# Patient Record
Sex: MALE | Race: BLACK OR AFRICAN AMERICAN | Employment: FULL TIME | URBAN - METROPOLITAN AREA
[De-identification: names, ages, dates, MRNs, and addresses within clinical notes are randomized per-mention and may not be internally consistent; named-entity substitution may affect disease eponyms.]

---

## 2018-05-05 ENCOUNTER — RECORDS - HEALTHEAST (OUTPATIENT)
Dept: LAB | Facility: CLINIC | Age: 43
End: 2018-05-05

## 2018-05-05 LAB
ALBUMIN UR-MCNC: NEGATIVE MG/DL
APPEARANCE UR: CLEAR
BILIRUB UR QL STRIP: NEGATIVE
CHOLEST SERPL-MCNC: 207 MG/DL
COLOR UR AUTO: NORMAL
FASTING STATUS PATIENT QL REPORTED: ABNORMAL
FASTING STATUS PATIENT QL REPORTED: NORMAL
GLUCOSE BLD-MCNC: 92 MG/DL (ref 70–125)
GLUCOSE UR STRIP-MCNC: NEGATIVE MG/DL
HDLC SERPL-MCNC: 75 MG/DL
HGB UR QL STRIP: NEGATIVE
KETONES UR STRIP-MCNC: NEGATIVE MG/DL
LDLC SERPL CALC-MCNC: 111 MG/DL
LEUKOCYTE ESTERASE UR QL STRIP: NEGATIVE
NITRATE UR QL: NEGATIVE
PH UR STRIP: 6.5 [PH] (ref 4.5–8)
SICKLE CELL PREP: NEGATIVE
SP GR UR STRIP: 1.01 (ref 1–1.03)
TRIGL SERPL-MCNC: 103 MG/DL
UROBILINOGEN UR STRIP-ACNC: NORMAL

## 2020-08-23 ENCOUNTER — HOSPITAL ENCOUNTER (OUTPATIENT)
Facility: CLINIC | Age: 45
Setting detail: OBSERVATION
Discharge: HOME | End: 2020-08-24
Attending: EMERGENCY MEDICINE | Admitting: HOSPITALIST
Payer: OTHER GOVERNMENT

## 2020-08-23 ENCOUNTER — APPOINTMENT (OUTPATIENT)
Dept: MRI IMAGING | Facility: CLINIC | Age: 45
End: 2020-08-23
Attending: EMERGENCY MEDICINE
Payer: OTHER GOVERNMENT

## 2020-08-23 DIAGNOSIS — M54.50 LOW BACK PAIN RADIATING TO LEFT LEG: ICD-10-CM

## 2020-08-23 DIAGNOSIS — M51.16 LUMBAR DISC HERNIATION WITH RADICULOPATHY: Primary | ICD-10-CM

## 2020-08-23 DIAGNOSIS — R79.89 ELEVATED TROPONIN: ICD-10-CM

## 2020-08-23 DIAGNOSIS — M79.605 LOW BACK PAIN RADIATING TO LEFT LEG: ICD-10-CM

## 2020-08-23 DIAGNOSIS — R55 SYNCOPE, UNSPECIFIED SYNCOPE TYPE: ICD-10-CM

## 2020-08-23 LAB
ANION GAP SERPL CALCULATED.3IONS-SCNC: 5 MMOL/L (ref 3–14)
BASOPHILS # BLD AUTO: 0.1 10E9/L (ref 0–0.2)
BASOPHILS NFR BLD AUTO: 1.1 %
BUN SERPL-MCNC: 23 MG/DL (ref 7–30)
CALCIUM SERPL-MCNC: 8.6 MG/DL (ref 8.5–10.1)
CHLORIDE SERPL-SCNC: 105 MMOL/L (ref 94–109)
CO2 SERPL-SCNC: 25 MMOL/L (ref 20–32)
CREAT SERPL-MCNC: 1.42 MG/DL (ref 0.66–1.25)
D DIMER PPP FEU-MCNC: 0.3 UG/ML FEU (ref 0–0.5)
DIFFERENTIAL METHOD BLD: ABNORMAL
EOSINOPHIL # BLD AUTO: 0.5 10E9/L (ref 0–0.7)
EOSINOPHIL NFR BLD AUTO: 9.4 %
ERYTHROCYTE [DISTWIDTH] IN BLOOD BY AUTOMATED COUNT: 13.8 % (ref 10–15)
GFR SERPL CREATININE-BSD FRML MDRD: 60 ML/MIN/{1.73_M2}
GLUCOSE SERPL-MCNC: 163 MG/DL (ref 70–99)
HBA1C MFR BLD: 5.5 % (ref 0–5.6)
HCT VFR BLD AUTO: 47.2 % (ref 40–53)
HGB BLD-MCNC: 14.6 G/DL (ref 13.3–17.7)
IMM GRANULOCYTES # BLD: 0 10E9/L (ref 0–0.4)
IMM GRANULOCYTES NFR BLD: 0.4 %
LYMPHOCYTES # BLD AUTO: 2.1 10E9/L (ref 0.8–5.3)
LYMPHOCYTES NFR BLD AUTO: 40.2 %
MCH RBC QN AUTO: 27.3 PG (ref 26.5–33)
MCHC RBC AUTO-ENTMCNC: 30.9 G/DL (ref 31.5–36.5)
MCV RBC AUTO: 88 FL (ref 78–100)
MONOCYTES # BLD AUTO: 0.3 10E9/L (ref 0–1.3)
MONOCYTES NFR BLD AUTO: 5.8 %
NEUTROPHILS # BLD AUTO: 2.3 10E9/L (ref 1.6–8.3)
NEUTROPHILS NFR BLD AUTO: 43.1 %
NRBC # BLD AUTO: 0 10*3/UL
NRBC BLD AUTO-RTO: 0 /100
PLATELET # BLD AUTO: 199 10E9/L (ref 150–450)
POTASSIUM SERPL-SCNC: 3.9 MMOL/L (ref 3.4–5.3)
RBC # BLD AUTO: 5.35 10E12/L (ref 4.4–5.9)
SODIUM SERPL-SCNC: 135 MMOL/L (ref 133–144)
TROPONIN I SERPL-MCNC: 0.13 UG/L (ref 0–0.04)
TROPONIN I SERPL-MCNC: 0.13 UG/L (ref 0–0.04)
WBC # BLD AUTO: 5.3 10E9/L (ref 4–11)

## 2020-08-23 PROCEDURE — 93005 ELECTROCARDIOGRAM TRACING: CPT | Mod: 76

## 2020-08-23 PROCEDURE — 25800030 ZZH RX IP 258 OP 636: Performed by: HOSPITALIST

## 2020-08-23 PROCEDURE — 96376 TX/PRO/DX INJ SAME DRUG ADON: CPT

## 2020-08-23 PROCEDURE — 96361 HYDRATE IV INFUSION ADD-ON: CPT

## 2020-08-23 PROCEDURE — U0003 INFECTIOUS AGENT DETECTION BY NUCLEIC ACID (DNA OR RNA); SEVERE ACUTE RESPIRATORY SYNDROME CORONAVIRUS 2 (SARS-COV-2) (CORONAVIRUS DISEASE [COVID-19]), AMPLIFIED PROBE TECHNIQUE, MAKING USE OF HIGH THROUGHPUT TECHNOLOGIES AS DESCRIBED BY CMS-2020-01-R: HCPCS | Performed by: EMERGENCY MEDICINE

## 2020-08-23 PROCEDURE — 25800030 ZZH RX IP 258 OP 636: Performed by: EMERGENCY MEDICINE

## 2020-08-23 PROCEDURE — 25000132 ZZH RX MED GY IP 250 OP 250 PS 637: Performed by: HOSPITALIST

## 2020-08-23 PROCEDURE — 25000128 H RX IP 250 OP 636: Performed by: EMERGENCY MEDICINE

## 2020-08-23 PROCEDURE — 83036 HEMOGLOBIN GLYCOSYLATED A1C: CPT | Performed by: EMERGENCY MEDICINE

## 2020-08-23 PROCEDURE — G0378 HOSPITAL OBSERVATION PER HR: HCPCS

## 2020-08-23 PROCEDURE — 99220 ZZC INITIAL OBSERVATION CARE,LEVL III: CPT | Mod: AI | Performed by: HOSPITALIST

## 2020-08-23 PROCEDURE — 25000132 ZZH RX MED GY IP 250 OP 250 PS 637: Performed by: EMERGENCY MEDICINE

## 2020-08-23 PROCEDURE — 96374 THER/PROPH/DIAG INJ IV PUSH: CPT

## 2020-08-23 PROCEDURE — 84484 ASSAY OF TROPONIN QUANT: CPT | Performed by: EMERGENCY MEDICINE

## 2020-08-23 PROCEDURE — 99207 ZZC CDG-CODE CATEGORY CHANGED: CPT | Performed by: HOSPITALIST

## 2020-08-23 PROCEDURE — 85379 FIBRIN DEGRADATION QUANT: CPT | Performed by: EMERGENCY MEDICINE

## 2020-08-23 PROCEDURE — 36415 COLL VENOUS BLD VENIPUNCTURE: CPT | Performed by: HOSPITALIST

## 2020-08-23 PROCEDURE — 84484 ASSAY OF TROPONIN QUANT: CPT | Performed by: HOSPITALIST

## 2020-08-23 PROCEDURE — 25000131 ZZH RX MED GY IP 250 OP 636 PS 637: Performed by: EMERGENCY MEDICINE

## 2020-08-23 PROCEDURE — 72148 MRI LUMBAR SPINE W/O DYE: CPT

## 2020-08-23 PROCEDURE — 93005 ELECTROCARDIOGRAM TRACING: CPT

## 2020-08-23 PROCEDURE — 80048 BASIC METABOLIC PNL TOTAL CA: CPT | Performed by: EMERGENCY MEDICINE

## 2020-08-23 PROCEDURE — C9803 HOPD COVID-19 SPEC COLLECT: HCPCS

## 2020-08-23 PROCEDURE — 99285 EMERGENCY DEPT VISIT HI MDM: CPT | Mod: 25

## 2020-08-23 PROCEDURE — 85025 COMPLETE CBC W/AUTO DIFF WBC: CPT | Performed by: EMERGENCY MEDICINE

## 2020-08-23 RX ORDER — SODIUM CHLORIDE 9 MG/ML
1000 INJECTION, SOLUTION INTRAVENOUS CONTINUOUS
Status: DISCONTINUED | OUTPATIENT
Start: 2020-08-23 | End: 2020-08-23 | Stop reason: ALTCHOICE

## 2020-08-23 RX ORDER — HYDROMORPHONE HYDROCHLORIDE 1 MG/ML
0.5 INJECTION, SOLUTION INTRAMUSCULAR; INTRAVENOUS; SUBCUTANEOUS
Status: DISCONTINUED | OUTPATIENT
Start: 2020-08-23 | End: 2020-08-23

## 2020-08-23 RX ORDER — ACETAMINOPHEN 325 MG/1
650 TABLET ORAL EVERY 4 HOURS PRN
Status: DISCONTINUED | OUTPATIENT
Start: 2020-08-23 | End: 2020-08-24 | Stop reason: HOSPADM

## 2020-08-23 RX ORDER — ONDANSETRON 2 MG/ML
4 INJECTION INTRAMUSCULAR; INTRAVENOUS EVERY 6 HOURS PRN
Status: DISCONTINUED | OUTPATIENT
Start: 2020-08-23 | End: 2020-08-24 | Stop reason: HOSPADM

## 2020-08-23 RX ORDER — HYDROCODONE BITARTRATE AND ACETAMINOPHEN 5; 325 MG/1; MG/1
1 TABLET ORAL EVERY 6 HOURS PRN
Status: DISCONTINUED | OUTPATIENT
Start: 2020-08-23 | End: 2020-08-23

## 2020-08-23 RX ORDER — ONDANSETRON 4 MG/1
4 TABLET, ORALLY DISINTEGRATING ORAL EVERY 6 HOURS PRN
Status: DISCONTINUED | OUTPATIENT
Start: 2020-08-23 | End: 2020-08-24 | Stop reason: HOSPADM

## 2020-08-23 RX ORDER — HYDROCODONE BITARTRATE AND ACETAMINOPHEN 5; 325 MG/1; MG/1
1-2 TABLET ORAL EVERY 6 HOURS PRN
Status: DISCONTINUED | OUTPATIENT
Start: 2020-08-23 | End: 2020-08-24 | Stop reason: HOSPADM

## 2020-08-23 RX ORDER — FEXOFENADINE HCL 180 MG/1
180 TABLET ORAL DAILY
COMMUNITY

## 2020-08-23 RX ORDER — ASPIRIN 81 MG/1
81 TABLET ORAL DAILY
Status: DISCONTINUED | OUTPATIENT
Start: 2020-08-24 | End: 2020-08-24 | Stop reason: HOSPADM

## 2020-08-23 RX ORDER — PREDNISONE 20 MG/1
60 TABLET ORAL ONCE
Status: COMPLETED | OUTPATIENT
Start: 2020-08-23 | End: 2020-08-23

## 2020-08-23 RX ORDER — FEXOFENADINE HCL 180 MG/1
180 TABLET ORAL DAILY
Status: DISCONTINUED | OUTPATIENT
Start: 2020-08-24 | End: 2020-08-24 | Stop reason: HOSPADM

## 2020-08-23 RX ORDER — SODIUM CHLORIDE, SODIUM LACTATE, POTASSIUM CHLORIDE, CALCIUM CHLORIDE 600; 310; 30; 20 MG/100ML; MG/100ML; MG/100ML; MG/100ML
INJECTION, SOLUTION INTRAVENOUS CONTINUOUS
Status: ACTIVE | OUTPATIENT
Start: 2020-08-23 | End: 2020-08-24

## 2020-08-23 RX ORDER — NALOXONE HYDROCHLORIDE 0.4 MG/ML
.1-.4 INJECTION, SOLUTION INTRAMUSCULAR; INTRAVENOUS; SUBCUTANEOUS
Status: DISCONTINUED | OUTPATIENT
Start: 2020-08-23 | End: 2020-08-24 | Stop reason: HOSPADM

## 2020-08-23 RX ORDER — FLUTICASONE PROPIONATE 50 MCG
2 SPRAY, SUSPENSION (ML) NASAL DAILY
Status: DISCONTINUED | OUTPATIENT
Start: 2020-08-24 | End: 2020-08-24 | Stop reason: CLARIF

## 2020-08-23 RX ORDER — ASPIRIN 325 MG
325 TABLET ORAL ONCE
Status: COMPLETED | OUTPATIENT
Start: 2020-08-23 | End: 2020-08-23

## 2020-08-23 RX ORDER — FLUTICASONE PROPIONATE 50 MCG
2 SPRAY, SUSPENSION (ML) NASAL DAILY
COMMUNITY

## 2020-08-23 RX ORDER — ACETAMINOPHEN 650 MG/1
650 SUPPOSITORY RECTAL EVERY 4 HOURS PRN
Status: DISCONTINUED | OUTPATIENT
Start: 2020-08-23 | End: 2020-08-24 | Stop reason: HOSPADM

## 2020-08-23 RX ADMIN — PREDNISONE 60 MG: 20 TABLET ORAL at 18:21

## 2020-08-23 RX ADMIN — ASPIRIN 325 MG ORAL TABLET 325 MG: 325 PILL ORAL at 18:49

## 2020-08-23 RX ADMIN — HYDROMORPHONE HYDROCHLORIDE 0.5 MG: 1 INJECTION, SOLUTION INTRAMUSCULAR; INTRAVENOUS; SUBCUTANEOUS at 19:35

## 2020-08-23 RX ADMIN — HYDROCODONE BITARTRATE AND ACETAMINOPHEN 1 TABLET: 5; 325 TABLET ORAL at 22:31

## 2020-08-23 RX ADMIN — HYDROCODONE BITARTRATE AND ACETAMINOPHEN 1 TABLET: 5; 325 TABLET ORAL at 21:42

## 2020-08-23 RX ADMIN — SODIUM CHLORIDE, POTASSIUM CHLORIDE, SODIUM LACTATE AND CALCIUM CHLORIDE: 600; 310; 30; 20 INJECTION, SOLUTION INTRAVENOUS at 21:30

## 2020-08-23 RX ADMIN — HYDROMORPHONE HYDROCHLORIDE 0.5 MG: 1 INJECTION, SOLUTION INTRAMUSCULAR; INTRAVENOUS; SUBCUTANEOUS at 18:21

## 2020-08-23 RX ADMIN — SODIUM CHLORIDE 1000 ML: 9 INJECTION, SOLUTION INTRAVENOUS at 19:18

## 2020-08-23 ASSESSMENT — ENCOUNTER SYMPTOMS
VOMITING: 0
NECK PAIN: 0
SHORTNESS OF BREATH: 0
HEADACHES: 0
ABDOMINAL PAIN: 0
CHILLS: 0
WEAKNESS: 1
NUMBNESS: 1
NAUSEA: 0
FEVER: 0
BACK PAIN: 1

## 2020-08-23 ASSESSMENT — MIFFLIN-ST. JEOR: SCORE: 1976.29

## 2020-08-23 NOTE — ED TRIAGE NOTES
A&O x4, ABCs intact. Pt presents with back pain and syncope. Pt states that he had lower left back pain that radiates down his left leg. Pt states he herniated his disk years ago and this pain reminds him of that. Pt denies any urinary symptoms. Pt reports that he had a syncopal even at 1615 and hit the front of his head on the ground from sitting in a chair. Pt is noted to be very diaphoretic.

## 2020-08-23 NOTE — ED PROVIDER NOTES
History     Chief Complaint:  Back Pain and Syncopal Episode    HPI   Hong Lowry is a 44 year old male who presents with back pain and a syncopal episode. Per the patient, he's been having intermittent back pain which worsened today at about 1000 after he ran. He took ibuprofen about 2.5 hours ago, and went inside after sitting outside reading. He states he felt lightheaded after standing up and had a syncopal episode where he hit his head, but got up right away. He states his back pain is an 8 on the pain scale, with shooting pain down his leg with weakness, numbness, and tingling in his leg. He had similar pain five years ago with a herniated disc in his back, and had a microdiscectomy done. He does not follow-up with a neurosurgeon. Denies groin tingling, incontinence, abdominal pain, fever, chills, chest pain, shortness of breath, headaches, nausea, and vomiting. He is not on blood thinners. Denies neck pain.    Allergies:  Penicillins    Medications:    Allegra  Flomase    Past Medical History:    Herniated disc    Past Surgical History:    The patient does not have any pertinent past surgical history..    Family History:    No past pertinent family history.    Social History:  Patient presented to the ER with an unknown female.  Smoking Status: Never Smoker  Smokeless Tobacco: Never Used     Review of Systems   Constitutional: Negative for chills and fever.   Respiratory: Negative for shortness of breath.    Cardiovascular: Negative for chest pain.   Gastrointestinal: Negative for abdominal pain, nausea and vomiting.   Genitourinary:        No incontinence   Musculoskeletal: Positive for back pain. Negative for neck pain.   Neurological: Positive for syncope, weakness and numbness. Negative for headaches.   All other systems reviewed and are negative.        Physical Exam     Patient Vitals for the past 24 hrs:   BP Temp Temp src Pulse Resp SpO2 Height Weight   08/23/20 2351 125/82 97.6  F (36.4  C)  "Oral 72 20 97 % -- --   08/23/20 2052 (!) 143/87 98  F (36.7  C) Oral 66 16 99 % 1.803 m (5' 11\") 105.4 kg (232 lb 6.4 oz)   08/23/20 1940 -- -- -- -- -- 98 % -- --   08/23/20 1930 (!) 140/90 -- -- (!) 49 -- 98 % -- --   08/23/20 1920 (!) 145/102 -- -- 50 -- 98 % -- --   08/23/20 1915 (!) 145/102 -- -- 61 -- 98 % -- --   08/23/20 1900 (!) 147/102 -- -- 63 -- 99 % -- --   08/23/20 1845 (!) 139/95 -- -- 57 -- 98 % -- --   08/23/20 1830 (!) 144/96 -- -- 60 -- 99 % -- --   08/23/20 1800 (!) 147/96 -- -- (!) 45 -- 100 % -- --   08/23/20 1745 (!) 144/105 -- -- (!) 46 -- 96 % -- --   08/23/20 1708 106/55 97.8  F (36.6  C) Oral 52 22 99 % -- --       Physical Exam  General: Alert, no acute distress; nontoxic appearing  Neuro:  PERRL.  EOMI.  No focal deficits  HEENT:  Moist mucous membranes. Conjunctiva normal.   CV:  RRR, no m/r/g, skin warm and well perfused  Pulm:  CTAB, no wheezes/ronchi/rales.  No acute distress, breathing comfortably  GI:  Soft, nontender, nondistended.  No rebound or guarding.  Normal bowel sounds; no masses  MSK:  Moving all extremities.  No focal areas of edema, erythema, or tenderness; no midline spine tenderness.  No CVA tenderness.  Tender over left paraspinal muscle, positive SLR on left.  5/5 strength with left ankle dorsiflexion/plantarflexion; Decreased sensation to light touch thoughout left foot compared to right.  DP/PT pulses 2+ bilaterally.  Skin:  WWP, no rashes, no lower extremity edema, skin color normal, no diaphoresis  Psych:  Well-appearing, normal affect, regular speech    Emergency Department Course     ECG #1:  ECG taken at 1716, ECG read at 1720  Sinus bradycardia with sinus arrhythmia  Otherwise normal ECG  Rate 56 bpm. LA interval 170 ms. QRS duration 108 ms. QT/QTc 456/440 ms. P-R-T axes 53 80 49.    ECG #2:  ECG taken at 1921, ECG read at 1926  Sinus bradycardia  Otherwise normal ECG  Rate 50 bpm. LA interval 170 ms. QRS duration 100 ms. QT/QTc 450/410 ms. P-R-T axes 35 62 " 20.    Imaging:  Radiology findings were communicated with the patient who voiced understanding of the findings.    Lumbar Spine MRI w/o Contrast  1.  [Moderate lumbar spondylosis. Large caudally dissecting left paracentral extrusion at L3-L4 compresses the descending left L4 nerve in the lateral recess before the left L4-L5 foramen. Correlate for left L4 radicular symptoms.  2.  Left lateral recess narrowing L2-L3 could impinge on the left L3 nerve.  3.  Right lateral recess narrowing L4-L5 status post right hemilaminectomy.  4.  No high-grade canal or foraminal narrowing.]  Reading per radiology.    Laboratory:  Laboratory findings were communicated with the patient who voiced understanding of the findings.    CBC: WBC 5.3, HGB 14.6,   BMP: Glucose 163(H), Creatinine 1.42(H), GFR estimate 60(L) o/w WNL  Troponin (Collected 1750): 0.133(HH)  Hemoglobin A1c: 5.5    Asymptomatic COVID-19 by PCR: In process    Procedures     Interventions:  1821: Dilaudid, 0.5 mg, IV  1821: Prednisone, 60 mg, Oral  1849: Aspirin, 325 mg, Oral  1918: Normal Saline, 1 liter, IV bolus   1935: Dilaudid, 0.5 mg, IV    Emergency Department Course:    ED Course as of Aug 24 0156   Sun Aug 23, 2020   1716 EKG obtained in the ED, see results above.        1750 IV was inserted and blood was drawn for laboratory testing, results above.       1758 Nursing notes and vitals reviewed.      1803 I performed a physical exam on the patient as documented above      1850 Patient rechecked and updated. I performed a bedside ultrasound.      1921 EKG obtained in the ED, see results above.        1924 I spoke with Dr. Marcano of the hospitalist service from [facility] regarding patient's presentation, findings, and plan of care.       1932 The patient was sent for an MRI while in the emergency department, results above.        1942 Patient was swabbed for COVID-19 testing.        Findings and plan explained to the patient who consents to  admission. Discussed the patient with Dr. Marcano, who will admit the patient to an obs bed for further monitoring, evaluation, and treatment.    Impression & Plan      Medical Decision Making:  Hong Lowry is a 44 year old male who presents to the emergency department today for evaluation of syncopal episode after sharp left lower back pain rating down to his left leg.  Please see HPI for specifics.  History of microdiscectomy with similar symptoms 5 years prior.  He has had off-and-on symptoms of left low back pain with radiation to the left leg that today after running.  Noted to have a syncopal episode by significant other.  He did not have any seizure-like activity or postictal symptoms and therefore I have a low suspicion for seizure.  He denies any prodromal symptoms of headache, chest pain, shortness of breath or abdominal pain.  He currently denies any symptoms and states he has left low back pain with radiation down his leg.  Denies any bowel/bladder incontinence.  He is afebrile and vitally stable.  Broad differentials considered including but is not limited to cardiac dysrhythmia, severe electrolyte/metabolic disturbance, AAA, vasovagal syncope amongst others.  EKG shows no signs of acute ischemia or infarct.  Intervals are within normal limits.  No history of exertional syncope in the past.  No evidence of WPW, HOCM, prolonged QT, Brugada.  Surprisingly, patient with mildly elevated troponin as noted above.  Also, elevated creatinine with no baseline to compare.  Otherwise rest of his lab studies are noncontributory.  He is low risk for PE and is PERC negative.  I did perform a bedside ultrasound and abdominal aorta throughout was less than 2 cm in size.  Images not saved to PACS.  I did obtain MRI of the lumbar spine that shows large left paracentral extrusion as noted above but otherwise no signs of spinal cord compression or cauda equina.  Repeat EKG showed no dynamic changes.  Syncope may very  well be vasovagal from pain response, however, given elevation in troponin, will admit the patient for observation for continued work-up/echocardiogram and management.  Patient denies any chest pain.  Patient agreeable with being admitted.  Discussed case with Dr. Marcano who graciously except the patient.      Covid-19  Hong Lowry was evaluated during a global COVID-19 pandemic, which necessitated consideration that the patient might be at risk for infection with the SARS-CoV-2 virus that causes COVID-19.   Applicable protocols for evaluation were followed during the patient's care.   COVID-19 was considered as part of the patient's evaluation. The plan for testing is:  a test was obtained during this visit.      Diagnosis:    ICD-10-CM    1. Syncope, unspecified syncope type  R55 Asymptomatic COVID-19 Virus (Coronavirus) by PCR     Hemoglobin A1c     Hemoglobin A1c     D dimer quantitative     D dimer quantitative     Troponin I     CANCELED: Hemoglobin A1c     CANCELED: D dimer quantitative   2. Low back pain radiating to left leg  M54.5     M79.605    3. Elevated troponin  R79.89      Disposition:   Patient was admitted to an obs bed.    Scribe Disclosure:  I, Eddi Angel, am serving as a scribe at 5:59 PM on 8/23/2020 to document services personally performed by Pancho Mai MD based on my observations and the provider's statements to me.    [facility] EMERGENCY DEPARTMENT       Pancho Mai MD  08/24/20 0156

## 2020-08-24 ENCOUNTER — APPOINTMENT (OUTPATIENT)
Dept: CARDIOLOGY | Facility: CLINIC | Age: 45
End: 2020-08-24
Attending: HOSPITALIST
Payer: OTHER GOVERNMENT

## 2020-08-24 VITALS
HEART RATE: 67 BPM | BODY MASS INDEX: 32.53 KG/M2 | HEIGHT: 71 IN | SYSTOLIC BLOOD PRESSURE: 156 MMHG | RESPIRATION RATE: 16 BRPM | DIASTOLIC BLOOD PRESSURE: 84 MMHG | OXYGEN SATURATION: 98 % | WEIGHT: 232.4 LBS | TEMPERATURE: 98.3 F

## 2020-08-24 DIAGNOSIS — M51.26 HERNIATION OF INTERVERTEBRAL DISC OF LUMBAR SPINE: Primary | ICD-10-CM

## 2020-08-24 DIAGNOSIS — M54.16 LUMBAR RADICULOPATHY: ICD-10-CM

## 2020-08-24 LAB
ALBUMIN SERPL-MCNC: 3.6 G/DL (ref 3.4–5)
ALP SERPL-CCNC: 54 U/L (ref 40–150)
ALT SERPL W P-5'-P-CCNC: 26 U/L (ref 0–70)
ANION GAP SERPL CALCULATED.3IONS-SCNC: 5 MMOL/L (ref 3–14)
AST SERPL W P-5'-P-CCNC: 25 U/L (ref 0–45)
BASOPHILS # BLD AUTO: 0 10E9/L (ref 0–0.2)
BASOPHILS NFR BLD AUTO: 0.2 %
BILIRUB SERPL-MCNC: 0.5 MG/DL (ref 0.2–1.3)
BUN SERPL-MCNC: 14 MG/DL (ref 7–30)
CALCIUM SERPL-MCNC: 8.5 MG/DL (ref 8.5–10.1)
CHLORIDE SERPL-SCNC: 108 MMOL/L (ref 94–109)
CHOLEST SERPL-MCNC: 176 MG/DL
CO2 SERPL-SCNC: 24 MMOL/L (ref 20–32)
CREAT SERPL-MCNC: 1.17 MG/DL (ref 0.66–1.25)
DIFFERENTIAL METHOD BLD: ABNORMAL
EOSINOPHIL # BLD AUTO: 0 10E9/L (ref 0–0.7)
EOSINOPHIL NFR BLD AUTO: 0 %
ERYTHROCYTE [DISTWIDTH] IN BLOOD BY AUTOMATED COUNT: 13.7 % (ref 10–15)
GFR SERPL CREATININE-BSD FRML MDRD: 76 ML/MIN/{1.73_M2}
GLUCOSE SERPL-MCNC: 124 MG/DL (ref 70–99)
HCT VFR BLD AUTO: 47.3 % (ref 40–53)
HDLC SERPL-MCNC: 85 MG/DL
HGB BLD-MCNC: 14.8 G/DL (ref 13.3–17.7)
IMM GRANULOCYTES # BLD: 0 10E9/L (ref 0–0.4)
IMM GRANULOCYTES NFR BLD: 0.3 %
INTERPRETATION ECG - MUSE: NORMAL
INTERPRETATION ECG - MUSE: NORMAL
LDLC SERPL CALC-MCNC: 83 MG/DL
LYMPHOCYTES # BLD AUTO: 1.6 10E9/L (ref 0.8–5.3)
LYMPHOCYTES NFR BLD AUTO: 18.1 %
MCH RBC QN AUTO: 27.2 PG (ref 26.5–33)
MCHC RBC AUTO-ENTMCNC: 31.3 G/DL (ref 31.5–36.5)
MCV RBC AUTO: 87 FL (ref 78–100)
MONOCYTES # BLD AUTO: 0.2 10E9/L (ref 0–1.3)
MONOCYTES NFR BLD AUTO: 2 %
NEUTROPHILS # BLD AUTO: 7.1 10E9/L (ref 1.6–8.3)
NEUTROPHILS NFR BLD AUTO: 79.4 %
NONHDLC SERPL-MCNC: 91 MG/DL
NRBC # BLD AUTO: 0 10*3/UL
NRBC BLD AUTO-RTO: 0 /100
PLATELET # BLD AUTO: 222 10E9/L (ref 150–450)
POTASSIUM SERPL-SCNC: 4.5 MMOL/L (ref 3.4–5.3)
PROT SERPL-MCNC: 6.8 G/DL (ref 6.8–8.8)
RBC # BLD AUTO: 5.45 10E12/L (ref 4.4–5.9)
SARS-COV-2 RNA SPEC QL NAA+PROBE: NOT DETECTED
SODIUM SERPL-SCNC: 137 MMOL/L (ref 133–144)
SPECIMEN SOURCE: NORMAL
TRIGL SERPL-MCNC: 42 MG/DL
TROPONIN I SERPL-MCNC: 0.06 UG/L (ref 0–0.04)
TROPONIN I SERPL-MCNC: 0.07 UG/L (ref 0–0.04)
WBC # BLD AUTO: 9 10E9/L (ref 4–11)

## 2020-08-24 PROCEDURE — 93306 TTE W/DOPPLER COMPLETE: CPT | Mod: 26 | Performed by: INTERNAL MEDICINE

## 2020-08-24 PROCEDURE — 99204 OFFICE O/P NEW MOD 45 MIN: CPT | Mod: 25 | Performed by: INTERNAL MEDICINE

## 2020-08-24 PROCEDURE — 25000132 ZZH RX MED GY IP 250 OP 250 PS 637: Performed by: HOSPITALIST

## 2020-08-24 PROCEDURE — 80061 LIPID PANEL: CPT | Performed by: HOSPITALIST

## 2020-08-24 PROCEDURE — 85025 COMPLETE CBC W/AUTO DIFF WBC: CPT | Performed by: HOSPITALIST

## 2020-08-24 PROCEDURE — 80053 COMPREHEN METABOLIC PANEL: CPT | Performed by: HOSPITALIST

## 2020-08-24 PROCEDURE — 84484 ASSAY OF TROPONIN QUANT: CPT | Performed by: HOSPITALIST

## 2020-08-24 PROCEDURE — 36415 COLL VENOUS BLD VENIPUNCTURE: CPT | Performed by: HOSPITALIST

## 2020-08-24 PROCEDURE — 99217 ZZC OBSERVATION CARE DISCHARGE: CPT | Performed by: PHYSICIAN ASSISTANT

## 2020-08-24 PROCEDURE — G0378 HOSPITAL OBSERVATION PER HR: HCPCS

## 2020-08-24 PROCEDURE — 40000893 ZZH STATISTIC PT IP EVAL DEFER: Performed by: PHYSICAL THERAPIST

## 2020-08-24 PROCEDURE — 93306 TTE W/DOPPLER COMPLETE: CPT

## 2020-08-24 RX ORDER — GABAPENTIN 300 MG/1
300 CAPSULE ORAL AT BEDTIME
Qty: 30 CAPSULE | Refills: 1 | Status: SHIPPED | OUTPATIENT
Start: 2020-08-24

## 2020-08-24 RX ORDER — DEXAMETHASONE 4 MG/1
4 TABLET ORAL
Qty: 5 TABLET | Refills: 0 | Status: SHIPPED | OUTPATIENT
Start: 2020-08-24 | End: 2020-08-29

## 2020-08-24 RX ORDER — HYDROCODONE BITARTRATE AND ACETAMINOPHEN 5; 325 MG/1; MG/1
1-2 TABLET ORAL EVERY 6 HOURS PRN
Qty: 30 TABLET | Refills: 0 | Status: SHIPPED | OUTPATIENT
Start: 2020-08-24

## 2020-08-24 RX ORDER — IBUPROFEN 200 MG
600-800 TABLET ORAL EVERY 6 HOURS PRN
COMMUNITY
Start: 2020-08-24

## 2020-08-24 RX ORDER — ACETAMINOPHEN 500 MG
1000 TABLET ORAL EVERY 8 HOURS PRN
COMMUNITY
Start: 2020-08-24

## 2020-08-24 RX ADMIN — HYDROCODONE BITARTRATE AND ACETAMINOPHEN 2 TABLET: 5; 325 TABLET ORAL at 04:31

## 2020-08-24 RX ADMIN — FEXOFENADINE HYDROCHLORIDE 180 MG: 180 TABLET ORAL at 09:03

## 2020-08-24 RX ADMIN — HYDROCODONE BITARTRATE AND ACETAMINOPHEN 2 TABLET: 5; 325 TABLET ORAL at 11:11

## 2020-08-24 RX ADMIN — ASPIRIN 81 MG: 81 TABLET, COATED ORAL at 09:03

## 2020-08-24 NOTE — PLAN OF CARE
PRIMARY DIAGNOSIS: SYNCOPE, Left back/leg pain  OUTPATIENT/OBSERVATION GOALS TO BE MET BEFORE DISCHARGE:  1. Orthostatic performed: No    2. Diagnostic testing complete & at baseline neurologic testing: Yes    3. Cleared by consultants (if involved): No    4. Interpretation of cardiac rhythm per telemetry tech: SR w/ hr 68    5. Tolerating adequate PO diet and medications: Yes    6. Return to near baseline physical activity or neurologic status: Yes    Discharge Planner Nurse   Safe discharge environment identified: Yes  Barriers to discharge: Yes       Entered by: Nubia Gonzales 08/23/2020 10:24 PM     Vitals are Temp: 98  F (36.7  C) Temp src: Oral BP: (!) 143/87 Pulse: 66   Resp: 16 SpO2: 99 %.  Patient is Alert and Oriented x4. They are SBA with no assistive devices .  Pt is a Regular diet, will be NPO at midnight.  They are complaining of 5/10 pain in their left back, hip, and leg. Slight numbness in left leg. Norco given for pain.  Patient has Lactated Ringer's running at 100 mL per hour. Will continue to monitor and provide cares.    Please review provider order for any additional goals.   Nurse to notify provider when observation goals have been met and patient is ready for discharge.

## 2020-08-24 NOTE — PHARMACY-ADMISSION MEDICATION HISTORY
Admission medication history interview status completed with help of nurse, Roseann Roque. No further clarifications on medication required. See [facility] admission navigator for allergy information, prior to admission medications and immunization status.     Medication history interview done via telephone during Covid-19 pandemic, indicate source(s): Patient  Medication history resources (including written lists, pill bottles, clinic record):None    Changes made to PTA medication list:  Added: none  Deleted: none  Changed: sig for flonase    Actions taken by pharmacist (provider contacted, etc):None     Additional medication history information:None    Medication reconciliation/reorder completed by provider prior to medication history?  N   (Y/N)     For patients on insulin therapy: N  (Y/N)      Prior to Admission medications    Medication Sig Last Dose Taking? Auth Provider   fexofenadine (ALLEGRA) 180 MG tablet Take 180 mg by mouth daily 8/23/2020 at Unknown time Yes Reported, Patient   fluticasone (FLONASE) 50 MCG/ACT nasal spray Spray 2 sprays into both nostrils daily  8/23/2020 at Unknown time Yes Reported, Patient

## 2020-08-24 NOTE — PLAN OF CARE
PRIMARY DIAGNOSIS: SYNCOPE, Left back/leg pain  OUTPATIENT/OBSERVATION GOALS TO BE MET BEFORE DISCHARGE:  1. Orthostatic performed: No    2. Diagnostic testing complete & at baseline neurologic testing: Yes    3. Cleared by consultants (if involved): No    4. Interpretation of cardiac rhythm per telemetry tech: SR w/ hr 75    5. Tolerating adequate PO diet and medications: Yes    6. Return to near baseline physical activity or neurologic status: Yes    Discharge Planner Nurse   Safe discharge environment identified: Yes  Barriers to discharge: Yes       Entered by: Nubia Gonzales 08/24/2020 6:38 AM     Vitals are Temp: 97.7  F (36.5  C) Temp src: Oral BP: (!) 141/76 Pulse: 61   Resp: 16 SpO2: 95 %.  Patient is Alert and Oriented x4. They are SBA with no assistive devices .  Pt is  NPO since midnight.  They are complaining of 5/10 pain in their left back, hip, and leg. Slight numbness in left leg. Norco given for pain.  Patient has Lactated Ringer's running at 100 mL per hour. Serial troponin trending down, morning recheck in process. Plan: echo, cardio consult, spine surgery consult, tele, and pain control.    Please review provider order for any additional goals.   Nurse to notify provider when observation goals have been met and patient is ready for discharge.

## 2020-08-24 NOTE — PLAN OF CARE
Patient's After Visit Summary was reviewed with patient.  Patient verbalized understanding of After Visit Summary, recommended follow up and was given an opportunity to ask questions.   Discharge medications sent home with patient/family: No, sent to pt's pharmacy  Discharged with spouse via private ride.   OBSERVATION patient END time: 11:39am

## 2020-08-24 NOTE — CONSULTS
[facility]    CARDIOLOGY CONSULT    Date of Admission:  8/23/2020  Date of Consult: August 24, 2020    ASSESSMENT:  44-year-old male seen for syncope.  Suspect this was vasovagal.  He is a very good exercise capacity and no recent cardiac symptoms.  He had back pain yesterday which certainly could have triggered the syncopal episode.  He has been in sinus rhythm since admission.  EKGs show no abnormalities.  Echocardiogram shows preserved ejection fraction.  The mild troponin elevation is likely demand from his syncopal episode.  With his very good exercise capacity and no recent symptoms and lack of cardiac risk factors, would not do stress test at this time.    RECOMMENDATIONS:  1.  Syncope, suspect vasovagal  -No further cardiac evaluation    2.  Minimal troponin elevation, suspect demand secondary to syncope  -No further evaluation at this time    Please call with further questions.    Rickey Marie MD  Cardiology - [facility]  Pager:  [phone]  Text Page  August 24, 2020    CODE STATUS:  Full Code    REASON FOR CONSULT: Syncope    PRIMARY CARE PHYSICIAN:  [facility]    HISTORY OF PRESENT ILLNESS:  44 year old male with no cardiac history is seen for syncope.  He has no significant cardiac risk factors.    At baseline he is quite physically active.  He works out 4 times per week doing combination of cardio exercise and weights.  He goes running 1-2 times a week and denies any exertional chest pain, shortness of breath, lightheadedness, dizziness, or previous syncope.    Yesterday he went for 1.5 mile run around 9 AM.  He felt fine and had no unusual symptoms.  Shortly after the run, he started developing low back pain.  This persisted throughout the day.  Around 3 PM, the pain was worse.  He got up to walk into the house and had a brief syncopal episode lasting a few seconds, witnessed by his wife.  He felt a little lightheaded, but had no chest pain, palpitations, nausea, or  diaphoresis.  He had some fluids to drink that day, but not excessive amounts.    Overnight he has felt well.  His back pain has been controlled with the medication.  He has been in sinus rhythm on telemetry.  Initial troponin was 0.13 with a downward trend.  EKGs were normal.    PAST MEDICAL HISTORY:  Lumbar disc herniation, status post discectomy 5 years ago    HOME MEDICATIONS:  Prior to Admission Medications   Prescriptions Last Dose Informant Patient Reported? Taking?   fexofenadine (ALLEGRA) 180 MG tablet 8/23/2020 at Unknown time  Yes Yes   Sig: Take 180 mg by mouth daily   fluticasone (FLONASE) 50 MCG/ACT nasal spray 8/23/2020 at Unknown time  Yes Yes   Sig: Spray 2 sprays into both nostrils daily       Facility-Administered Medications: None       ALLERGIES:  Allergies   Allergen Reactions     Penicillins      Other reaction(s): Unknown  As an infant         SOCIAL HISTORY:  I have reviewed this patient's social history and updated it with pertinent information if needed. Hong Lowry      FAMILY HISTORY:  Mother and father with hypertension.    REVIEW OF SYSTEMS:  Constitutional:  No weight loss, fever, chills, weakness or fatigue.  HEENT:  Eyes:  No visual loss, blurred vision, double vision or yellow sclerae. No hearing loss, sneezing, congestion, runny nose or sore throat.  Skin:  No rash or itching.  Cardiovascular: per HPI  Respiratory: per HPI  GI:  No anorexia, nausea, vomiting or diarrhea. No abdominal pain or blood.  :  No dysurea, hematuria  Neurologic:  No headache, dizziness, paralysis, ataxia, numbness or tingling in the extremities. No change in bowel or bladder control.  Musculoskeletal:  Per HPI  Hematologic:  No anemia, bleeding or bruising.  Lymphatics:  No enlarged nodes. No history of splenectomy.  Psychiatric:  No history of depression or anxiety.  Endocrine:  No reports of sweating, cold or heat intolerance. No polyuria or polydipsia.  Allergies:  No history of asthma, hives,  eczema or rhinitis.    PHYSICAL EXAM:  Temp: 98.4  F (36.9  C) Temp src: Oral BP: 135/72 Pulse: 61   Resp: 16 SpO2: 97 % O2 Device: None (Room air)    Vital Signs with Ranges  Temp:  [97.6  F (36.4  C)-98.4  F (36.9  C)] 98.4  F (36.9  C)  Pulse:  [45-72] 61  Resp:  [16-22] 16  BP: (106-147)/() 135/72  SpO2:  [95 %-100 %] 97 %  232 lbs 6.4 oz    Constitutional: awake, alert, no distress  Eyes: PERRL, sclera nonicteric  ENT: trachea midline  Respiratory: Lungs clear  Cardiovascular: Regular rate and rhythm, no murmurs  GI: nondistended, nontender, bowel sounds present  Lymph/Hematologic: no lymphadenopathy  Skin: dry, no rash  Musculoskeletal: good muscle tone, strength 5/5 in upper and lower extremities  Neurologic: no focal deficits  Neuropsychiatric: appropriate affact    No intake or output data in the 24 hours ending 08/24/20 0828    DATA:  Labs: Potassium 4.5, creatinine 1.2, LFTs normal, serial troponin 0.13, 0.07, 0.06, WBC 9, hemoglobin 14, platelets 222, d-dimer 0.3  Recent Labs   Lab Test 08/24/20  0603   CHOL 176   HDL 85   LDL 83   TRIG 42     EKG: August 23: Sinus rhythm, rate 56, normal axis and intervals    Tele: sinus, no arrhythmias    Echo: August 24: Prelim report: EF 65%, no wall motion abnormality, normal RV, no significant valve disease

## 2020-08-24 NOTE — CONSULTS
SW consulted for discharge planning. Pt is SBA. Per care team rounds, no SW needs identified. Anticipated discharge today. Please re-consult if needs arise.     MOO Ramon, [facility]   Inpatient Care Coordination  [facility]   [phone]

## 2020-08-24 NOTE — ED NOTES
[facility]  ED Nurse Handoff Report    Hong Lowry is a 44 year old male   ED Chief complaint: Loss of Consciousness  . ED Diagnosis:   Final diagnoses:   Syncope, unspecified syncope type   Low back pain radiating to left leg   Elevated troponin     Allergies:   Allergies   Allergen Reactions     Penicillins      Other reaction(s): Unknown  As an infant         Code Status: Full Code  Activity level - Baseline/Home:  Independent. Activity Level - Current:   Stand by Assist. Lift room needed: No. Bariatric: No   Needed: No   Isolation: No. Infection: Not Applicable.     Vital Signs:   Vitals:    08/23/20 1830 08/23/20 1845 08/23/20 1900 08/23/20 1915   BP: (!) 144/96 (!) 139/95 (!) 147/102 (!) 145/102   Pulse: 60 57 63 61   Resp:       Temp:       TempSrc:       SpO2: 99% 98% 99% 98%       Cardiac Rhythm:  ,   Cardiac  Cardiac Rhythm: Sinus bradycardia  Pain level: 0-10 Pain Scale: 4  Patient confused: No. Patient Falls Risk: Yes.   Elimination Status: Has voided   Patient Report - Initial Complaint: A&O x4, ABCs intact. Pt presents with back pain and syncope. Pt states that he had lower left back pain that radiates down his left leg. Pt states he herniated his disk years ago and this pain reminds him of that. Pt denies any urinary symptoms. Pt reports that he had a syncopal even at 1615 and hit the front of his head on the ground from sitting in a chair. Pt is noted to be very diaphoretic.. Focused Assessment:   18:50 Cardiac Cardiac - Cardiac WDL: -WDL except; all (no CP or SOB; positive trop after LOC)  Cardiac Monitoring - EKG Monitoring: Yes  Cardiac Regularity: Regular  Cardiac Rhythm: SB  KB     18:50 Musculoskeletal Musculoskeletal - Musculoskeletal WDL: -WDL except  CMS Intact: Yes  Musculoskeletal Comment: left low back pain radiates down left leg          Tests Performed: labs, MRI. Abnormal Results:   Labs Ordered and Resulted from Time of ED Arrival Up to the Time of  Departure from the ED   CBC WITH PLATELETS DIFFERENTIAL - Abnormal; Notable for the following components:       Result Value    MCHC 30.9 (*)     All other components within normal limits   BASIC METABOLIC PANEL - Abnormal; Notable for the following components:    Glucose 163 (*)     Creatinine 1.42 (*)     GFR Estimate 60 (*)     All other components within normal limits   TROPONIN I - Abnormal; Notable for the following components:    Troponin I ES 0.133 (*)     All other components within normal limits   HEMOGLOBIN A1C   COVID-19 VIRUS (CORONAVIRUS) BY PCR     Lumbar spine MRI w/o contrast    (Results Pending)     Waiting for MRI results.   Treatments provided: pain mx, prednisone, ASA  Family Comments: wife at bedside  OBS brochure/video discussed/provided to patient:  N/A  ED Medications:   Medications   HYDROmorphone (PF) (DILAUDID) injection 0.5 mg (0.5 mg Intravenous Given 8/23/20 1935)   0.9% sodium chloride BOLUS (1,000 mLs Intravenous New Bag 8/23/20 1918)   sodium chloride 0.9% infusion (has no administration in time range)   predniSONE (DELTASONE) tablet 60 mg (60 mg Oral Given 8/23/20 1821)   aspirin (ASA) tablet 325 mg (325 mg Oral Given 8/23/20 1849)     Drips infusing:  No  For the majority of the shift, the patient's behavior Green. Interventions performed were none.    Sepsis treatment initiated: No       ED Nurse Name/Phone Number: Roseann Roque RN,   7:56 PM    RECEIVING UNIT ED HANDOFF REVIEW    Above ED Nurse Handoff Report was reviewed: Yes  Reviewed by: Nubia Gonzales RN on August 23, 2020 at 8:05 PM

## 2020-08-24 NOTE — PLAN OF CARE
PRIMARY DIAGNOSIS: SYNCOPE, Left back/leg pain  OUTPATIENT/OBSERVATION GOALS TO BE MET BEFORE DISCHARGE:  1. Orthostatic performed: No    2. Diagnostic testing complete & at baseline neurologic testing: Yes    3. Cleared by consultants (if involved): No    4. Interpretation of cardiac rhythm per telemetry tech: SR w/ hr 63    5. Tolerating adequate PO diet and medications: Yes    6. Return to near baseline physical activity or neurologic status: Yes    Discharge Planner Nurse   Safe discharge environment identified: Yes  Barriers to discharge: Yes       Entered by: Nubia Gonzales 08/24/2020 12:59 AM     Vitals are Temp: 97.6  F (36.4  C) Temp src: Oral BP: 125/82 Pulse: 72   Resp: 20 SpO2: 97 %.  Patient is Alert and Oriented x4. They are SBA with no assistive devices .  Pt is  NPO since midnight.  They are complaining of 3/10 pain in their left back, hip, and leg. Slight numbness in left leg. Norco given for pain.  Patient has Lactated Ringer's running at 100 mL per hour. 2200 troponin trending down 0.127, next check at 0200.  Pt denies chest pain. Using call light appropriately. Will continue to monitor and provide cares.    Please review provider order for any additional goals.   Nurse to notify provider when observation goals have been met and patient is ready for discharge.

## 2020-08-24 NOTE — PROGRESS NOTES
ROOM # [id number]    Living Situation (if not independent, order SW consult): Home w/ spouse and 4 kids  Facility name:   : [place], spouse    Activity level at baseline: Ind  Activity level on admit: Sba      Patient registered to observation; given Patient Bill of Rights; given the opportunity to ask questions about observation status and their plan of care.  Patient has been oriented to the observation room, bathroom and call light is in place.    Discussed discharge goals and expectations with patient/family.

## 2020-08-24 NOTE — PLAN OF CARE
Discharge Planner PT   Patient plan for discharge: Home  Current status: Orders received. Chart reviewed. Pt is mobilizing with SBA, no concerns regarding mobility. PA does not identify any mobility needs.   Barriers to return to prior living situation: None   Recommendations for discharge: Per PA  Rationale for recommendations: No PT needs. Defer to PA/MD for discharge recommendations.        Entered by: Niru Centeno 08/24/2020 10:29 AM

## 2020-08-24 NOTE — DISCHARGE SUMMARY
[facility]  Discharge Summary  Hospitalist    Date of Admission:  8/23/2020  Date of Discharge:  8/24/2020    Discharging Provider: Melinda Suero PAC    Discharge Diagnoses   Large left L3-4 disk herniation with radiculopathy  Demand ischemia with normal Echo    History of Present Illness   Hong Lowry is an 44 year old male with DDD and prior lumbar right L4-5 decompression who presented to [facility] ED on 8/24/2020 with sudden onset back and left leg pain with subsequent syncope.  Patient is very healthy and active at baseline.  He went out for a run, came back and was sitting outside reading when the pain and syncope occurred.  BMP notable for Cr 1.43, CBC within normal limits.  Initial trop 0.133.  DDimer 0.3.  EKG showed sinus bradycardia.  MRI Lumbar Spine revealed a large 14 x 11 x 20 mm L3-4 disk herniation compressing the descending left L4 nerve.  Patient admitted for pain control and work-up of syncope.    Troponins trended down:  0.133 >> 0.127 >> 0.074 >> 0.064.  Cr normalized to 1.17.  Fasting lipid panel looked very good with , LDL 83, HDL 85, Trig 42.  Echo showed normal right and left ventricular function with EF 55-60%, normal filling pressures, and no significant valvular disease.  Patient seen by Cardiology who felt elevated trop likely due to demand and were not concerned for ACS or other cardiac etiology.     Patient is able to ambulate, albeit with a limp.  He will be going home on a 5 day burst of dexamethasone and PRN Norco.  He has been asked to follow-up with [facility] as soon as possible and told to come back to the ER if he develops worsening pain, urinary retention, or bowel incontinence.  Patient voiced understanding.  Referral placed to [facility].    JULIET Wade        Code Status   Full Code       Primary Care Physician   [facility]    Physical Exam   Temp: 98.4  F (36.9  C) Temp src: Oral BP: 135/72 Pulse: 61   Resp: 16 SpO2: 97 % O2 Device: None  (Room air)    Vitals:    08/23/20 2052   Weight: 105.4 kg (232 lb 6.4 oz)     Vital Signs with Ranges  Temp:  [97.6  F (36.4  C)-98.4  F (36.9  C)] 98.4  F (36.9  C)  Pulse:  [45-72] 61  Resp:  [16-22] 16  BP: (106-147)/() 135/72  SpO2:  [95 %-100 %] 97 %  No intake/output data recorded.      GENERAL:  Pleasant, cooperative, alert. Well developed, well nourished.   HEENT: Normocephalic, atraumatic.  Extra occular mm intact.  Sclera clear. PERRL.  Mucous membranes moist.  No erythema; uvula midline.  Neck supple with no adenopathy.   PULMONOLOGY: Clear to auscultation bilaterally with good exchange at the bases and no wheeze or crackle.  CARDIAC: Regular rate and rhythm.  No appreciated murmur.  Normal S1/S2.  No S3/S4.  ABDOMEN: Soft, nontender non distended. No hepatosplenomegaly.  MUSCULOSKELETAL:  Moving x 4 spontaneously with CMS intact x4.  Normal bulk and tone.  No LE edema.  Radial pulses 2+ bilaterally.    NEURO: Alert and oriented x3.  CN II-XII grossly intact and symmetric.  No ataxia or tremor.  Nonfocal exam.      Discharge Disposition   Discharged to home  Condition at discharge: Stable    Consultations This Hospital Stay   CARDIOLOGY IP CONSULT  SOCIAL WORK IP CONSULT  PHYSICAL THERAPY ADULT IP CONSULT  SPINE SURGERY ADULT IP CONSULT    Time Spent on this Encounter   I, JULIET Wade, personally saw the patient today and spent greater than 30 minutes discharging this patient.    Discharge Orders      Reason for your hospital stay    You were brought in after a syncopal episode at home and you were found to have a large left-sided disk herniation at the L3-4 level.  Thankfully your Echocardiogram is normal.  [facility] will be calling you to set up an appointment with either Dr Barahona, Dr Garcia, or one of their PAs.  Please take Tylenol and ibuprofen up to 3-4 times per day for pain.  You can use Norco also for acute pain.  Be careful with how much Tylenol you are taking as  Norco contains Tylenol.  Max dose of Tylenol is 3000 mg per day from all sources.  You will be starting gabapentin, which is a medicine for nerve pain.  The disk herniation is pinching your nerve which is causing your leg pain.  It is recommended you start gabapentin at night as it can cause drowsiness.  If you do have drowsiness, it will improve with time.  If you tolerate gabapentin but are not seeing much improvement, it can always be increased up to 900 mg three times per day.  If you notice significant worsening of pain, new leg weakness, inability to urinate, or bowel incontinence please come back to the ER for urgent evaluation.     Follow-up and recommended labs and tests     Please follow-up with [facility].  They will be calling you to set up an appointment.  Your MRI images have been pushed to their system.     Discharge Medications   Current Discharge Medication List      START taking these medications    Details   acetaminophen (TYLENOL) 500 MG tablet Take 2 tablets (1,000 mg) by mouth every 8 hours as needed for pain    Associated Diagnoses: Lumbar disc herniation with radiculopathy      dexamethasone (DECADRON) 4 MG tablet Take 1 tablet (4 mg) by mouth daily (with breakfast) for 5 days  Qty: 5 tablet, Refills: 0    Associated Diagnoses: Lumbar disc herniation with radiculopathy      gabapentin (NEURONTIN) 300 MG capsule Take 1 capsule (300 mg) by mouth At Bedtime  Qty: 30 capsule, Refills: 1    Associated Diagnoses: Lumbar disc herniation with radiculopathy      HYDROcodone-acetaminophen (NORCO) 5-325 MG tablet Take 1-2 tablets by mouth every 6 hours as needed for moderate to severe pain  Qty: 30 tablet, Refills: 0    Comments: . No driving if taking this medication. May cause constipation and sedation.  Associated Diagnoses: Lumbar disc herniation with radiculopathy      ibuprofen (ADVIL/MOTRIN) 200 MG tablet Take 3-4 tablets (600-800 mg) by mouth every 6 hours as needed for moderate pain     Associated Diagnoses: Lumbar disc herniation with radiculopathy         CONTINUE these medications which have NOT CHANGED    Details   fexofenadine (ALLEGRA) 180 MG tablet Take 180 mg by mouth daily      fluticasone (FLONASE) 50 MCG/ACT nasal spray Spray 2 sprays into both nostrils daily            Allergies   Allergies   Allergen Reactions     Penicillins      Other reaction(s): Unknown  As an infant         Data   Most Recent 3 CBC's:  Recent Labs   Lab Test 08/24/20  0603 08/23/20  1750   WBC 9.0 5.3   HGB 14.8 14.6   MCV 87 88    199      Most Recent 3 BMP's:  Recent Labs   Lab Test 08/24/20  0603 08/23/20  1750    135   POTASSIUM 4.5 3.9   CHLORIDE 108 105   CO2 24 25   BUN 14 23   CR 1.17 1.42*   ANIONGAP 5 5   JOSH 8.5 8.6   * 163*     Most Recent 2 LFT's:  Recent Labs   Lab Test 08/24/20  0603   AST 25   ALT 26   ALKPHOS 54   BILITOTAL 0.5     Most Recent 3 Troponin's:  Recent Labs   Lab Test 08/24/20  0603 08/24/20  0208 08/23/20  2204   TROPI 0.064* 0.074* 0.127*     Most Recent Cholesterol Panel:  Recent Labs   Lab Test 08/24/20  0603   CHOL 176   LDL 83   HDL 85   TRIG 42     Most Recent TSH, T4 and A1c Labs:  Recent Labs   Lab Test 08/23/20  1750   A1C 5.5     Results for orders placed or performed during the hospital encounter of 08/23/20   Lumbar spine MRI w/o contrast    [facility]  MR LUMBAR SPINE W/O CONTRAST  8/23/2020 7:32 PM     INDICATION: Radiculopathy, > 6 weeks conservative treatment, persistent sx. See the Clinical Information for Interpreting Provider.  TECHNIQUE: Routine.  CONTRAST: None.  COMPARISON: None.    FINDINGS: Nomenclature is based on 5 lumbar type vertebral bodies. T12 was only partially included on this study. Alignment is normal. Vertebral body heights are maintained. Mild Modic type I degenerative endplate change L4-L5. Incidental hemangioma in   the S1 vertebral body. Normal distal spinal cord and cauda equina with conus  medullaris at T12-L1. Prior right L4-L5 hemilaminectomy. No extraspinal abnormality. Unremarkable visualized bony pelvis.    T12-L1: Only included on the sagittal sequences. Disc space height and signal are preserved. No herniation, canal, or foraminal narrowing.    L1-L2: Mild loss of disc height and signal. Mild disc bulging with small midline and right paracentral extrusion. No associated neural impingement. No facet arthropathy. Mild spinal canal stenosis. No right neural foraminal stenosis. No left neural   foraminal stenosis.    L2-L3: Mild loss of disc space height. Mild disc bulging eccentric to the left with a small midline protrusion. Left lateral recess narrowing possibly impinging on the descending left L3 nerve. Mild facet arthropathy. No spinal canal stenosis. No right   neural foraminal stenosis. No left neural foraminal stenosis.    L3-L4: Mild loss of disc space height. Disc bulging with a large caudally dissecting left paracentral extrusion measuring 14 x 11 x 20 mm ML x AP x craniocaudad extending below the level of the disc space compressing the descending left L4 nerve in the   lateral recess before the L4-L5 foramen. Mild facet arthropathy. No spinal canal stenosis. No right neural foraminal stenosis. No left neural foraminal stenosis.    L4-L5: Moderate loss of disc space height. Previous right hemilaminectomy. Shallow right paracentral protrusion. Mild right lateral recess narrowing. Moderate to marked right facet arthropathy and mild changes on the left. No spinal canal stenosis. No   right neural foraminal stenosis. No left neural foraminal stenosis.    L5-S1: Moderate loss of disc space height with preservation of T2 signal. Mild disc bulging eccentric to the right with a shallow right foraminal protrusion. No facet arthropathy. No spinal canal stenosis. Mild right neural foraminal stenosis. No left   neural foraminal stenosis.      Impression    CONCLUSION:  1.  [Moderate lumbar  spondylosis. Large caudally dissecting left paracentral extrusion at L3-L4 compresses the descending left L4 nerve in the lateral recess before the left L4-L5 foramen. Correlate for left L4 radicular symptoms.    2.  Left lateral recess narrowing L2-L3 could impinge on the left L3 nerve.    3.  Right lateral recess narrowing L4-L5 status post right hemilaminectomy.    4.  No high-grade canal or foraminal narrowing.]   Echocardiogram Complete    Narrative    [id number]  [id number]  [id number]  [id number]^F           [facility]  Echocardiography Laboratory  [address] [place] [address]        Name: NEVILLE GODWIN  MRN: [id number]  : 1975  Study Date: 2020 08:09 AM  Age: 44 yrs  Gender: Male  Patient Location: [facility]  Reason For Study: Syncope  Ordering Physician: SHERRY DELACRUZ  Referring Physician: [facility] [place]  Performed By: Leonora Bernal RDCS     BSA: 2.2 m2  Height: 71 in  Weight: 232 lb  HR: 66  BP: 143/87 mmHg  _____________________________________________________________________________  __        Procedure  Complete Portable Echo Adult.  _____________________________________________________________________________  __        Interpretation Summary     Left ventricular systolic function is normal.The visual ejection fraction is  estimated at 60-65%.  The right ventricular systolic function is normal.  There is trace aortic regurgitation.  Echo dense mobile filamentous structure seen in right atrium- not very well  visualized but could be chiari network.     No prior study for comparision.  _____________________________________________________________________________  __        Left Ventricle  The left ventricle is normal in size. There is normal left ventricular wall  thickness. Left ventricular systolic function is normal. The visual ejection  fraction is estimated at 60-65%. Diastolic Doppler findings (E/E' ratio and/or  other parameters) suggest  left ventricular filling pressures are normal. No  regional wall motion abnormalities noted.     Right Ventricle  The right ventricle is normal size. The right ventricular systolic function is  normal.     Atria  Normal left atrial size. Right atrial size is normal. Echo dense mobile  filamentous structure seen in right atrium- not very well visualized but could  be chiari network. There is no color Doppler evidence of an atrial shunt.     Mitral Valve  There is trace to mild mitral regurgitation. There is no mitral valve  stenosis.        Tricuspid Valve  There is trace tricuspid regurgitation. Right ventricular systolic pressure  could not be approximated due to inadequate tricuspid regurgitation.     Aortic Valve  The aortic valve is trileaflet. There is trace aortic regurgitation. No aortic  stenosis is present.     Pulmonic Valve  There is trace pulmonic valvular regurgitation. There is no pulmonic valvular  stenosis.     Vessels  The aortic root is normal size. Normal size ascending aorta. Dilation of the  inferior vena cava is present with normal respiratory variation in diameter.     Pericardium  There is no pericardial effusion.     _____________________________________________________________________________  __  MMode/2D Measurements & Calculations  IVSd: 0.84 cm  LVIDd: 5.1 cm  LVIDs: 3.3 cm  LVPWd: 0.87 cm  FS: 35.8 %  LV mass(C)d: 152.2 grams  LV mass(C)dI: 67.8 grams/m2     Ao root diam: 3.4 cm  LA dimension: 4.2 cm  asc Aorta Diam: 3.0 cm  LA/Ao: 1.2  LA Volume (BP): 72.8 ml  LA Volume Index (BP): 32.4 ml/m2  RWT: 0.34        Doppler Measurements & Calculations  MV E max alvarez: 66.0 cm/sec  MV A max alvarez: 79.5 cm/sec  MV E/A: 0.83  MV max PG: 3.5 mmHg  MV mean P.8 mmHg  MV V2 VTI: 25.4 cm     MV dec time: 0.31 sec  E/E' av.7  Lateral E/e': 6.5  Medial E/e': 7.0           _____________________________________________________________________________  __           Report approved by: Piotr Vats,  Halle 08/24/2020 09:25 AM

## 2020-08-24 NOTE — H&P
[facility]    History and Physical  Hospitalist     Date of Admission:  8/23/2020  Date of Service (when I saw the patient): 08/23/20  Provider: Yan Marcano MD      Chief Complaint   Fainting episode    History is obtained from the patient, electronic health record and emergency department physician    History of Present Illness   Hong Lowry is a 44 year old male who presents with concern for syncopal episode that happened today at home, six hours after he had been working out and running.  He has suffered in the past intermittent low back pain.  He underwent a microdiscectomy on the right side in the past.  He was when his wife when he felt left back pain radiating down to his leg, intense, very sharp.  Pain was in the same location that he had in the past.  Patient of the opinion that intensity of the pain made him faint (he lost consciousness) for brief period of time and it was witnessed by his wife.  He did not have any other prodromal symptoms.  He did not sustain any significant trauma to head or noble parts of the body.  He denies chest pain, nausea, vomiting, shortness of breath, numbness or tingling.  According to Dr. Mai emergency department physician I discussed this case with, the patient has a positive straight leg raising test on the left, no bowel or bladder incontinence or saddle anesthesia.  Incidentally as part of a syncope work-up, his troponin has been noted elevated.  His EKG shows sinus bradycardia but not ST abnormality.  Blood Pressure (Abnormal) 145/102   Pulse 61   Temperature 97.8  F (36.6  C) (Oral)   Respiration 22   Oxygen Saturation 98%   Lab work-up results:  CBC with differential all values within normal limits.  Chemistry creatinine 1.42, normal facial rate 60, other values are normal.  Troponin I-S 0.133.  Glycemia 163.  EKG shows sinus bradycardia around the 50s.  I have requested A1c.  MRI LS  CONCLUSION:  1.  [Moderate lumbar spondylosis. Large  caudally dissecting left paracentral extrusion at L3-L4 compresses the descending left L4 nerve in the lateral recess before the left L4-L5 foramen. Correlate for left L4 radicular symptoms.     2.  Left lateral recess narrowing L2-L3 could impinge on the left L3 nerve.     3.  Right lateral recess narrowing L4-L5 status post right hemilaminectomy.     4.  No high-grade canal or foraminal narrowing.]    Past Medical History    I have reviewed this patient's medical history and updated it with pertinent information if needed.      Assessment & Plan   Hong Lowry is a 44 year old male who presents after a fainting episode that happened between 10 to 15 minutes after patient drawn for work-up.  He relates the loss of consciousness to new onset of acute low back pain, very intense and sharp radiating down to the left leg.  He denies any other prodromal symptoms.  No history of chest pain or shortness of breath.  Incidentally found with high blood pressure, elevated troponin, hyperglycemia and sinus bradycardia.  He has a remote history of low back pain and he underwent microdiscectomy.    1.  Syncopal episode. Because of pain suspect vaso vagal, however elevated trop should not be downplay.   Admit to obs.  NPO after midnight.  Telemetry.  Serial troponin.  Echocardiogram.  Cardiac consult  ASA 81 mg daily.  Start Hep drip if having a concerning trop curve.   2.  Acute onset of low back pain with radiation to the left lower extremity. Large caudally dissecting left paracentral extrusion at L3-L4 compresses the descending left L4 nerve in the lateral recess before the left L4-L5 foramen. Correlate for left L4 radicular symptoms.  - [place] prn.  - PT consult and spinal surgeon  3.  Elevated blood pressure probably undiagnosed hypertension.   -Both parents have HTN. It may be circumstantial, but if persistent will add med  4.  Possible chronic kidney disease stage II versus JAYJAY.   LR hydration overnight, check BMP in  AM.   5.  Elevated blood sugar.  A1c 5.7 and a year ago  - A1c requested  6.  Sinus bradycardia.  - He works out routinely, o/w we will consider other poss. Wait for echo and rest of studies.      Code Status   Full Code    Primary Care Physician   No primary care provider on file.      Past Surgical History   I have reviewed this patient's surgical history and updated it with pertinent information if needed.  No past surgical history on file.    Prior to Admission Medications   Prior to Admission Medications   Prescriptions Last Dose Informant Patient Reported? Taking?   fexofenadine (ALLEGRA) 180 MG tablet 2020 at Unknown time  Yes Yes   Sig: Take 180 mg by mouth daily   fluticasone (FLONASE) 50 MCG/ACT nasal spray 2020 at Unknown time  Yes Yes   Si sprays      Facility-Administered Medications: None     Allergies   Allergies   Allergen Reactions     Penicillins      Other reaction(s): Unknown  As an infant         Social History   I have personally reviewed the social history with the patient showing.  Social History     Tobacco Use     Smoking status: Not on file   Substance Use Topics     Alcohol use: Not on file     Family History   I have reviewed this patient's family history and it is not contributory to the admission .       Review of Systems   Except as noted in the HPI, a 12-system Review of Systems was found to be negative.      Physical Exam   Vital Signs with Ranges  Temp:  [97.8  F (36.6  C)] 97.8  F (36.6  C)  Pulse:  [45-63] 61  Resp:  [22] 22  BP: (106-147)/() 145/102  SpO2:  [96 %-100 %] 98 %  0 lbs 0 oz    GEN:  Alert, oriented x 3, appears comfortable, NAD.  HEENT:  Normocephalic/atraumatic, no scleral icterus, no nasal discharge, mouth moist.  CV:  Regular rate and rhythm, no murmur or JVD.  S1 + S2 noted, no S3 or S4.  LUNGS:  Clear to auscultation bilaterally without rales/rhonchi/wheezing/retractions.  Symmetric chest rise on inhalation noted.  ABD:  Active bowel sounds,  soft, non-tender/non-distended.  No rebound/guarding/rigidity.  EXT:  No edema or cyanosis.  No joint synovitis noted.  SKIN:  Dry to touch, no exanthems noted in the visualized areas.       Data   I personally reviewed the EKG tracing showing Sinus bradycardia at 50'. .  Results for orders placed or performed during the hospital encounter of 08/23/20 (from the past 24 hour(s))   CBC with platelets differential   Result Value Ref Range    WBC 5.3 4.0 - 11.0 10e9/L    RBC Count 5.35 4.4 - 5.9 10e12/L    Hemoglobin 14.6 13.3 - 17.7 g/dL    Hematocrit 47.2 40.0 - 53.0 %    MCV 88 78 - 100 fl    MCH 27.3 26.5 - 33.0 pg    MCHC 30.9 (L) 31.5 - 36.5 g/dL    RDW 13.8 10.0 - 15.0 %    Platelet Count 199 150 - 450 10e9/L    Diff Method Automated Method     % Neutrophils 43.1 %    % Lymphocytes 40.2 %    % Monocytes 5.8 %    % Eosinophils 9.4 %    % Basophils 1.1 %    % Immature Granulocytes 0.4 %    Nucleated RBCs 0 0 /100    Absolute Neutrophil 2.3 1.6 - 8.3 10e9/L    Absolute Lymphocytes 2.1 0.8 - 5.3 10e9/L    Absolute Monocytes 0.3 0.0 - 1.3 10e9/L    Absolute Eosinophils 0.5 0.0 - 0.7 10e9/L    Absolute Basophils 0.1 0.0 - 0.2 10e9/L    Abs Immature Granulocytes 0.0 0 - 0.4 10e9/L    Absolute Nucleated RBC 0.0    Basic metabolic panel   Result Value Ref Range    Sodium 135 133 - 144 mmol/L    Potassium 3.9 3.4 - 5.3 mmol/L    Chloride 105 94 - 109 mmol/L    Carbon Dioxide 25 20 - 32 mmol/L    Anion Gap 5 3 - 14 mmol/L    Glucose 163 (H) 70 - 99 mg/dL    Urea Nitrogen 23 7 - 30 mg/dL    Creatinine 1.42 (H) 0.66 - 1.25 mg/dL    GFR Estimate 60 (L) >60 mL/min/[1.73_m2]    GFR Estimate If Black 70 >60 mL/min/[1.73_m2]    Calcium 8.6 8.5 - 10.1 mg/dL   Troponin I   Result Value Ref Range    Troponin I ES 0.133 (HH) 0.000 - 0.045 ug/L   EKG 12 lead   Result Value Ref Range    Interpretation ECG Click View Image link to view waveform and result        Disclaimer: This note consists of symbols derived from keyboarding,  dictation and/or voice recognition software. As a result, there may be errors in the script that have gone undetected. Please consider this when interpreting information found in this chart.

## 2021-01-15 ENCOUNTER — HEALTH MAINTENANCE LETTER (OUTPATIENT)
Age: 46
End: 2021-01-15

## 2021-10-24 ENCOUNTER — HEALTH MAINTENANCE LETTER (OUTPATIENT)
Age: 46
End: 2021-10-24

## 2022-02-13 ENCOUNTER — HEALTH MAINTENANCE LETTER (OUTPATIENT)
Age: 47
End: 2022-02-13

## 2022-10-16 ENCOUNTER — HEALTH MAINTENANCE LETTER (OUTPATIENT)
Age: 47
End: 2022-10-16

## 2023-03-26 ENCOUNTER — HEALTH MAINTENANCE LETTER (OUTPATIENT)
Age: 48
End: 2023-03-26

## 2023-05-22 ENCOUNTER — APPOINTMENT (OUTPATIENT)
Dept: GENERAL RADIOLOGY | Facility: CLINIC | Age: 48
End: 2023-05-22
Attending: EMERGENCY MEDICINE
Payer: OTHER GOVERNMENT

## 2023-05-22 ENCOUNTER — HOSPITAL ENCOUNTER (EMERGENCY)
Facility: CLINIC | Age: 48
Discharge: HOME | End: 2023-05-23
Attending: EMERGENCY MEDICINE | Admitting: EMERGENCY MEDICINE
Payer: OTHER GOVERNMENT

## 2023-05-22 DIAGNOSIS — M79.661 PAIN OF RIGHT LOWER LEG: ICD-10-CM

## 2023-05-22 DIAGNOSIS — Z87.39 HISTORY OF HERNIATED INTERVERTEBRAL DISC: ICD-10-CM

## 2023-05-22 PROCEDURE — 250N000013 HC RX MED GY IP 250 OP 250 PS 637: Performed by: EMERGENCY MEDICINE

## 2023-05-22 PROCEDURE — 72170 X-RAY EXAM OF PELVIS: CPT

## 2023-05-22 PROCEDURE — 72100 X-RAY EXAM L-S SPINE 2/3 VWS: CPT

## 2023-05-22 PROCEDURE — 99284 EMERGENCY DEPT VISIT MOD MDM: CPT

## 2023-05-22 RX ORDER — CYCLOBENZAPRINE HCL 10 MG
5-10 TABLET ORAL 3 TIMES DAILY PRN
Qty: 10 TABLET | Refills: 0 | Status: SHIPPED | OUTPATIENT
Start: 2023-05-22

## 2023-05-22 RX ORDER — ACETAMINOPHEN 500 MG
1000 TABLET ORAL EVERY 4 HOURS PRN
Status: DISCONTINUED | OUTPATIENT
Start: 2023-05-22 | End: 2023-05-23 | Stop reason: HOSPADM

## 2023-05-22 RX ORDER — METHYLPREDNISOLONE 4 MG
TABLET, DOSE PACK ORAL
Qty: 21 TABLET | Refills: 0 | Status: SHIPPED | OUTPATIENT
Start: 2023-05-22

## 2023-05-22 RX ORDER — OXYCODONE HYDROCHLORIDE 5 MG/1
10 TABLET ORAL ONCE
Status: COMPLETED | OUTPATIENT
Start: 2023-05-22 | End: 2023-05-22

## 2023-05-22 RX ORDER — IBUPROFEN 600 MG/1
600 TABLET, FILM COATED ORAL ONCE
Status: COMPLETED | OUTPATIENT
Start: 2023-05-22 | End: 2023-05-22

## 2023-05-22 RX ORDER — OXYCODONE HYDROCHLORIDE 5 MG/1
10 TABLET ORAL EVERY 6 HOURS PRN
Qty: 10 TABLET | Refills: 0 | Status: SHIPPED | OUTPATIENT
Start: 2023-05-22 | End: 2023-05-25

## 2023-05-22 RX ADMIN — OXYCODONE HYDROCHLORIDE 10 MG: 5 TABLET ORAL at 23:16

## 2023-05-22 RX ADMIN — ACETAMINOPHEN 1000 MG: 500 TABLET ORAL at 22:31

## 2023-05-22 RX ADMIN — IBUPROFEN 600 MG: 600 TABLET, FILM COATED ORAL at 22:31

## 2023-05-23 VITALS
TEMPERATURE: 98.2 F | HEIGHT: 74 IN | WEIGHT: 240 LBS | RESPIRATION RATE: 18 BRPM | OXYGEN SATURATION: 94 % | HEART RATE: 64 BPM | DIASTOLIC BLOOD PRESSURE: 98 MMHG | BODY MASS INDEX: 30.8 KG/M2 | SYSTOLIC BLOOD PRESSURE: 136 MMHG

## 2023-05-23 PROCEDURE — 96372 THER/PROPH/DIAG INJ SC/IM: CPT | Performed by: EMERGENCY MEDICINE

## 2023-05-23 PROCEDURE — 250N000011 HC RX IP 250 OP 636: Performed by: EMERGENCY MEDICINE

## 2023-05-23 RX ADMIN — HYDROMORPHONE HYDROCHLORIDE 1 MG: 1 INJECTION, SOLUTION INTRAMUSCULAR; INTRAVENOUS; SUBCUTANEOUS at 00:33

## 2023-05-23 NOTE — DISCHARGE INSTRUCTIONS
I recommend heat pack to her back, gentle stretching, Tylenol, ibuprofen and he can take the Flexeril as needed for more severe pain.  You can also try the oxycodone however would not take Flexeril and oxycodone together as this can cause unsafe drop in your ability to breathe.  You should return to the emergency room should you develop progressive pain, weakness or numbness of the leg have trouble urinating or you wet yourself, or numb when you wipe.  You should also return if you develop significant leg swelling or chest pain or shortness of breath.  Otherwise, you should follow-up with your surgeon for recheck

## 2023-05-23 NOTE — ED PROVIDER NOTES
"  History     Chief Complaint:  Leg Pain     The history is provided by the patient.      Hong Lowry is a 47 year old male with a history of slipped disc surgery who presents to the ED for right leg pain. Patient reports he had surgery for a slipped disc in 2016 and that he had similar symptoms with his back and right leg prior to his surgery. He reports the surgeon was Dr. Agusto Garcia and that the surgery helped immensly in reducing pain. He reports that today he started experiencing lower back pain this afternoon and this evening while doing the dishes at 1800 while standing. He reports the pain was intermittent and that he sat on his right side during dinner. He reports the pain shoots from his right flank and down his leg. He denies rectal or pelvic numbness, urinary or stool incontinence. He denies hurting his leg.    Independent Historian:   None - Patient Only    Review of External Notes:   None     Medications:    Flexeril  Roxicodone  Decadron  Allegra  Flonase  Neurontin  Norco  Prilosec  Albuterol    Past Medical History:    Slipped spinal disc  Syncope    Past Surgical History:    Slipped disc surgery  Asthma    Physical Exam     Patient Vitals for the past 24 hrs:   BP Temp Temp src Pulse Resp SpO2 Height Weight   05/22/23 2130 (!) 149/101 98.2  F (36.8  C) Oral 78 18 95 % 1.88 m (6' 2\") 108.9 kg (240 lb)      Physical Exam  Constitutional: Alert, attentive, GCS 15   HENT:   Eyes: EOM are normal, anicteric, conjugate gaze  CV: distal extremities warm, well perfused  Chest: Non-labored breathing on RA  MSK: No focal tenderness or swelling in right leg, no skin changes, 5/5 right lower extremity strength, no significant back tenderness. No leg swelling.  Neurological: Alert, attentive, moving all extremities equally.   Skin: Skin is warm and dry.    Emergency Department Course     Imaging:  XR Pelvis 1/2 Views   Final Result   IMPRESSION: Normal joint spaces and alignment. No fracture. Scrotal " clips.      Lumbar spine XR, 2-3 views   Final Result   IMPRESSION: 5 lumbar-type vertebral bodies. This is normal. No acute lumbar spine compression fracture. Moderate loss of disc space height L3-L4 through L5-S1.      Mild lower lumbar facet arthropathy.         Report per radiology    Emergency Department Course & Assessments:       Interventions:  Medications   acetaminophen (TYLENOL) tablet 1,000 mg (1,000 mg Oral $Given 23)   ibuprofen (ADVIL/MOTRIN) tablet 600 mg (600 mg Oral $Given 23)   oxyCODONE (ROXICODONE) tablet 10 mg (10 mg Oral $Given 23)     Independent Interpretation (X-rays, CTs, rhythm strip):  I personally reviewed his hip and L-spine x-rays, no evidence of clear fracture, disc height loss corresponds to his prior discectomy surgeries.    Assessments/Consultations/Discussion of Management or Tests:  ED Course as of 05/23/23 0003   Mon May 22, 2023   2340 I obtained history and examined the patient as noted above.        Social Determinants of Health affecting care:   None    Disposition:  The patient was discharged to home.     Impression & Plan      Medical Decision Makin-year-old male past medical history significant discectomy at L3/L4 and L5/S1 remotely who presents for atraumatic right anterior leg pain that feels similar to his prior disc herniation.  He denies any weakness in his leg, no saddle anesthesia, denies fevers, chest pain or shortness of breath.  He has pain with flexing his right hip however has no weakness.  With course of pain medication, his pain was improved to the point he felt he was able to walk, I will put him on a Medrol Dosepak for presumed likely disc herniation, see no indication for emergent MRI imaging.  Recommended follow-up with his surgeon who works at [facility].  I do not suspect DVT based on his exam, no negation for ultrasound, he is no risk factors for this.    Diagnosis:    ICD-10-CM    1. Pain of right lower leg  M79.661        2. History of herniated intervertebral disc  Z87.39          Discharge Medications:  New Prescriptions    CYCLOBENZAPRINE (FLEXERIL) 10 MG TABLET    Take 0.5-1 tablets (5-10 mg) by mouth 3 times daily as needed for muscle spasms    METHYLPREDNISOLONE (MEDROL DOSEPAK) 4 MG TABLET THERAPY PACK    Follow Package Directions    OXYCODONE (ROXICODONE) 5 MG TABLET    Take 2 tablets (10 mg) by mouth every 6 hours as needed for severe pain      Bassam Bryan MD  [employer]  1:42 AM 05/23/23     Scribe Disclosure:  Tamara GARCIA, am serving as a scribe at 12:02 AM on 5/23/2023 to document services personally performed by Bassam Bryan MD based on my observations and the provider's statements to me.   5/22/2023   Bassam Bryan MD Dunbar, John Forrest, MD  05/23/23 0142

## 2023-11-04 ENCOUNTER — HEALTH MAINTENANCE LETTER (OUTPATIENT)
Age: 48
End: 2023-11-04

## 2024-12-22 ENCOUNTER — HEALTH MAINTENANCE LETTER (OUTPATIENT)
Age: 49
End: 2024-12-22